# Patient Record
Sex: MALE | Race: WHITE | NOT HISPANIC OR LATINO | Employment: OTHER | URBAN - METROPOLITAN AREA
[De-identification: names, ages, dates, MRNs, and addresses within clinical notes are randomized per-mention and may not be internally consistent; named-entity substitution may affect disease eponyms.]

---

## 2024-02-05 ENCOUNTER — TELEPHONE (OUTPATIENT)
Dept: ALLERGY | Facility: CLINIC | Age: 79
End: 2024-02-05
Payer: COMMERCIAL

## 2024-02-12 ENCOUNTER — OFFICE VISIT (OUTPATIENT)
Dept: ALLERGY | Facility: CLINIC | Age: 79
End: 2024-02-12
Payer: COMMERCIAL

## 2024-02-12 VITALS
OXYGEN SATURATION: 97 % | WEIGHT: 205.2 LBS | HEART RATE: 58 BPM | DIASTOLIC BLOOD PRESSURE: 71 MMHG | BODY MASS INDEX: 27.79 KG/M2 | SYSTOLIC BLOOD PRESSURE: 126 MMHG | HEIGHT: 72 IN

## 2024-02-12 DIAGNOSIS — I25.10 ATHEROSCLEROSIS OF NATIVE CORONARY ARTERY OF NATIVE HEART WITHOUT ANGINA PECTORIS: ICD-10-CM

## 2024-02-12 DIAGNOSIS — G47.19 EXCESSIVE DAYTIME SLEEPINESS: Primary | ICD-10-CM

## 2024-02-12 DIAGNOSIS — G47.33 OSA (OBSTRUCTIVE SLEEP APNEA): ICD-10-CM

## 2024-02-12 PROCEDURE — 1036F TOBACCO NON-USER: CPT | Performed by: ALLERGY & IMMUNOLOGY

## 2024-02-12 PROCEDURE — 99213 OFFICE O/P EST LOW 20 MIN: CPT | Performed by: ALLERGY & IMMUNOLOGY

## 2024-02-12 NOTE — PROGRESS NOTES
Subjective   Patient ID:   [id number]   Carlos Eduardo Parson is a 78 y.o. male who presents for Sleep Apnea.    Chief Complaint   Patient presents with    Sleep Apnea          HPI  This patient is here to evaluate for:     on Bipap machine since feb. '18  doing well.   got flu shot already.   Patient presents for follow up of sleep apnea.   Some sleepiness when gets up but quickly is ok.     Current mask: Fitting well. using the FFM, resmed F20 with silicone  Current dme: [employer]   AMOR chip read:  usage over 4 hours: 89 /90 days   Average usage: 6:20 hours  Bipap 21/14 cm H2O WITH BACKUP RATE OF 12 BPM   AHI = 1.5  Mask leak 95th percentile is: 2.7  Naps: NO  Caffeine or stimulant use: 2 cups coffee/day.     This patient denies any sleepiness while driving or any accidents related to sleepiness. There is no report of nodding at stop lights/signs, crossing center lines, or motor vehicle accidents due to sleepiness.      1/9/18: bipap titration  He prefers to remain on current machine.     Review of Systems   All other systems reviewed and are negative.        Objective     /71 (BP Location: Left arm, Patient Position: Sitting)   Pulse 58   Ht 1.829 m (6')   Wt 93.1 kg (205 lb 3.2 oz)   SpO2 97%   BMI 27.83 kg/m²      Physical Exam  Constitutional:       General: He is not in acute distress.     Appearance: Normal appearance. He is not ill-appearing.   HENT:      Head: Normocephalic and atraumatic.      Right Ear: Tympanic membrane, ear canal and external ear normal.      Left Ear: Tympanic membrane, ear canal and external ear normal.      Nose: Nose normal. No congestion or rhinorrhea.      Mouth/Throat:      Mouth: Mucous membranes are moist.      Pharynx: Oropharynx is clear. No oropharyngeal exudate or posterior oropharyngeal erythema.   Eyes:      General:         Right eye: No discharge.         Left eye: No discharge.      Conjunctiva/sclera: Conjunctivae normal.   Cardiovascular:      Rate  and Rhythm: Normal rate and regular rhythm.      Heart sounds: Normal heart sounds. No murmur heard.     No friction rub. No gallop.   Pulmonary:      Effort: Pulmonary effort is normal. No respiratory distress.      Breath sounds: Normal breath sounds. No stridor. No wheezing, rhonchi or rales.   Chest:      Chest wall: No tenderness.   Abdominal:      General: Abdomen is flat.      Palpations: Abdomen is soft.   Musculoskeletal:         General: Normal range of motion.      Cervical back: Normal range of motion and neck supple.   Lymphadenopathy:      Cervical: No cervical adenopathy.   Skin:     General: Skin is warm and dry.      Findings: No erythema, lesion or rash.   Neurological:      General: No focal deficit present.      Mental Status: He is alert. Mental status is at baseline.   Psychiatric:         Mood and Affect: Mood normal.         Behavior: Behavior normal.         Thought Content: Thought content normal.         Judgment: Judgment normal.            No current outpatient medications on file.     No current facility-administered medications for this visit.       Summary of the labs over the past 6 months:    No visits with results within 6 Month(s) from this visit.   Latest known visit with results is:   [facility] Encounter on 02/17/2016   Component Date Value Ref Range Status    Ventricular Rate 02/17/2016 58  BPM Final    Atrial Rate 02/17/2016 58  BPM Final    ND Interval 02/17/2016 182  ms Final    QRS Duration 02/17/2016 104  ms Final    QT Interval 02/17/2016 446  ms Final    QTC Calculation(Bazett) 02/17/2016 437  ms Final    P Axis 02/17/2016 46  degrees Final    R Axis 02/17/2016 40  degrees Final    T Axis 02/17/2016 57  degrees Final    QRS Count 02/17/2016 10  beats Final    Q Onset 02/17/2016 218  ms Final    P Onset 02/17/2016 127  ms Final    P Offset 02/17/2016 188  ms Final    T Offset 02/17/2016 441  ms Final    QTC Fredericia 02/17/2016 440  ms Final         Assessment/Plan    Diagnoses and all orders for this visit:  Excessive daytime sleepiness  AMOR (obstructive sleep apnea)  Atherosclerosis of native coronary artery of native heart without angina pectoris        The Obstructive sleep apnea is well treated with BiPAP usage and the usage (compliance) of PAP is good. This patient is benefiting from using PAP and it is medically necessary. Recommend continuing the current plan and machine settings.     He will let us know if he interested in a new machine but it currently is working well.      Follow-up in 12 months with me or your PCP so we may reorder supplies, re-assess you and develop a more long term plan.       Norman Jean MD   Sleep

## 2025-02-18 ENCOUNTER — APPOINTMENT (OUTPATIENT)
Dept: ALLERGY | Facility: CLINIC | Age: 80
End: 2025-02-18
Payer: COMMERCIAL

## 2025-02-18 VITALS
BODY MASS INDEX: 27.83 KG/M2 | SYSTOLIC BLOOD PRESSURE: 119 MMHG | HEART RATE: 58 BPM | DIASTOLIC BLOOD PRESSURE: 61 MMHG | WEIGHT: 205.2 LBS

## 2025-02-18 DIAGNOSIS — G47.19 EXCESSIVE DAYTIME SLEEPINESS: ICD-10-CM

## 2025-02-18 DIAGNOSIS — G47.33 OSA (OBSTRUCTIVE SLEEP APNEA): Primary | ICD-10-CM

## 2025-02-18 DIAGNOSIS — I25.10 ATHEROSCLEROSIS OF NATIVE CORONARY ARTERY OF NATIVE HEART WITHOUT ANGINA PECTORIS: ICD-10-CM

## 2025-02-18 PROCEDURE — 99214 OFFICE O/P EST MOD 30 MIN: CPT | Performed by: ALLERGY & IMMUNOLOGY

## 2025-02-18 NOTE — PROGRESS NOTES
Subjective   Patient ID:   [id number]   Carlos Eduardo Parson is a 79 y.o. male who presents for Sleep Apnea (Here for 1 yr. [facility] for sleep apnea.).    Chief Complaint   Patient presents with    Sleep Apnea     Here for 1 yr. [facility] for sleep apnea.          HPI  This patient is here to evaluate for:  Obstructive Sleep Apnea  on Bipap machine since feb. '18  Machine working well and not interested in a new machine at this time.      Current mask: Fitting well. using the FFM, resmed F20 with silicone  Current dme: [employer]     AMOR chip read:  usage over 4 hours: 29 /30 days   Average usage: 6:13 hours  Bipap 21/14 cm H2O WITH BACKUP RATE OF 12 BPM   AHI = 1.3  Mask leak 95th percentile is: 0.2    Naps: NO  Caffeine or stimulant use: 2 cups coffee/day.     This patient denies any sleepiness while driving or any accidents related to sleepiness. There is no report of nodding at stop lights/signs, crossing center lines, or motor vehicle accidents due to sleepiness.      1/9/18: bipap titration  He prefers to remain on current machine.       Review of Systems   All other systems reviewed and are negative.        Objective     /61 (BP Location: Left arm, Patient Position: Sitting)   Pulse 58   Wt 93.1 kg (205 lb 3.2 oz)   BMI 27.83 kg/m²      Physical Exam  Constitutional:       Appearance: Normal appearance.   HENT:      Head: Normocephalic and atraumatic.   Eyes:      Extraocular Movements: Extraocular movements intact.      Conjunctiva/sclera: Conjunctivae normal.      Pupils: Pupils are equal, round, and reactive to light.   Pulmonary:      Effort: Pulmonary effort is normal.   Musculoskeletal:      Cervical back: Normal range of motion.   Neurological:      Mental Status: He is alert and oriented to person, place, and time. Mental status is at baseline.   Psychiatric:         Mood and Affect: Mood normal.         Behavior: Behavior normal.         Thought Content: Thought content normal.         Judgment:  Judgment normal.            No current outpatient medications on file.     No current facility-administered medications for this visit.       Summary of the labs over the past 6 months:    No visits with results within 6 Month(s) from this visit.   Latest known visit with results is:   Legacy Encounter on 02/17/2016   Component Date Value Ref Range Status    Ventricular Rate 02/17/2016 58  BPM Final    Atrial Rate 02/17/2016 58  BPM Final    MS Interval 02/17/2016 182  ms Final    QRS Duration 02/17/2016 104  ms Final    QT Interval 02/17/2016 446  ms Final    QTC Calculation(Bazett) 02/17/2016 437  ms Final    P Axis 02/17/2016 46  degrees Final    R Axis 02/17/2016 40  degrees Final    T Axis 02/17/2016 57  degrees Final    QRS Count 02/17/2016 10  beats Final    Q Onset 02/17/2016 218  ms Final    P Onset 02/17/2016 127  ms Final    P Offset 02/17/2016 188  ms Final    T Offset 02/17/2016 441  ms Final    QTC Fredericia 02/17/2016 440  ms Final         Assessment/Plan   Diagnoses and all orders for this visit:  AMOR (obstructive sleep apnea)  -     Positive Airway Pressure (PAP) Therapy  Atherosclerosis of native coronary artery of native heart without angina pectoris  -     Positive Airway Pressure (PAP) Therapy  Excessive daytime sleepiness  -     Positive Airway Pressure (PAP) Therapy      The Obstructive sleep apnea is well treated with BiPAP usage and the usage (compliance) of PAP is good. This patient is benefiting from using PAP and it is medically necessary. Recommend continuing the current plan and machine settings.    Obstructive sleep apnea (AMRO) is a risk factor for impairing one's ability to function on daily activities such as driving, focus and mental well-being.  And AMOR is a risk factor and can lead to chronic health conditions and cardiovascular complications such as heart disease, high blood pressure, diabetes, heart attacks and stroke. PAP has been shown to treat and prevent these conditions by  treating the obstructive sleep apnea. This patient has excessive daytime sleepiness, cad, and Obstructive Sleep Apnea,  and we are addressing its treatment with CPAP. The AMOR is WELL CONTROLLED and management with CPAP is reducing these risks.    Your supply order is good for the next 12 months. Unfortunately, due to a change in office policy and staffing, we are no longer writing for prescriptions for renewal of CPAP Supplies.      Next year, we recommend that you contact either your Primary Care Provider who may be able to order your routine CPAP supplies. Alternatively, you may contact  Sleep Medicine Scheduling at [phone] for a Sleep Medicine provider.  Typically, in 5 years you will be due for a new machine and we would be happy to see you at that time.    We apologize for any inconvenience and thank you for the opportunity to care for you.      Norman Jean MD